# Patient Record
Sex: FEMALE | Race: WHITE | Employment: OTHER | URBAN - NONMETROPOLITAN AREA
[De-identification: names, ages, dates, MRNs, and addresses within clinical notes are randomized per-mention and may not be internally consistent; named-entity substitution may affect disease eponyms.]

---

## 2022-01-01 ENCOUNTER — APPOINTMENT (OUTPATIENT)
Dept: GENERAL RADIOLOGY | Age: 87
DRG: 871 | End: 2022-01-01
Attending: FAMILY MEDICINE
Payer: MEDICARE

## 2022-01-01 ENCOUNTER — HOSPITAL ENCOUNTER (INPATIENT)
Age: 87
LOS: 2 days | DRG: 871 | End: 2022-12-11
Attending: FAMILY MEDICINE | Admitting: INTERNAL MEDICINE
Payer: MEDICARE

## 2022-01-01 ENCOUNTER — APPOINTMENT (OUTPATIENT)
Dept: ULTRASOUND IMAGING | Age: 87
DRG: 871 | End: 2022-01-01
Attending: FAMILY MEDICINE
Payer: MEDICARE

## 2022-01-01 VITALS
OXYGEN SATURATION: 92 % | TEMPERATURE: 97.8 F | RESPIRATION RATE: 20 BRPM | SYSTOLIC BLOOD PRESSURE: 119 MMHG | WEIGHT: 99.65 LBS | DIASTOLIC BLOOD PRESSURE: 63 MMHG

## 2022-01-01 LAB
ABO: NORMAL
AEROBIC CULTURE: NORMAL
ALLEN TEST: POSITIVE
ANION GAP SERPL CALCULATED.3IONS-SCNC: 11 MEQ/L (ref 8–16)
ANION GAP SERPL CALCULATED.3IONS-SCNC: 13 MEQ/L (ref 8–16)
ANISOCYTOSIS: PRESENT
ANTIBODY SCREEN: NORMAL
BACTERIA: ABNORMAL
BASE EXCESS (CALCULATED): -2.7 MMOL/L (ref -2.5–2.5)
BASE EXCESS (CALCULATED): -2.8 MMOL/L (ref -2.5–2.5)
BASE EXCESS (CALCULATED): -3.3 MMOL/L (ref -2.5–2.5)
BASE EXCESS (CALCULATED): -3.8 MMOL/L (ref -2.5–2.5)
BASE EXCESS (CALCULATED): -3.8 MMOL/L (ref -2.5–2.5)
BASE EXCESS MIXED: -2.4 MMOL/L (ref -2–3)
BASOPHILS # BLD: 0.1 %
BASOPHILS ABSOLUTE: 0 THOU/MM3 (ref 0–0.1)
BILIRUBIN URINE: NEGATIVE
BLOOD, URINE: ABNORMAL
BUN BLDV-MCNC: 44 MG/DL (ref 7–22)
BUN BLDV-MCNC: 48 MG/DL (ref 7–22)
CALCIUM SERPL-MCNC: 7.6 MG/DL (ref 8.5–10.5)
CALCIUM SERPL-MCNC: 8 MG/DL (ref 8.5–10.5)
CASTS: ABNORMAL /LPF
CASTS: ABNORMAL /LPF
CHARACTER, URINE: ABNORMAL
CHLORIDE BLD-SCNC: 104 MEQ/L (ref 98–111)
CHLORIDE BLD-SCNC: 105 MEQ/L (ref 98–111)
CO2: 21 MEQ/L (ref 23–33)
CO2: 23 MEQ/L (ref 23–33)
COLLECTED BY:: ABNORMAL
COLOR: YELLOW
CREAT SERPL-MCNC: 2 MG/DL (ref 0.4–1.2)
CREAT SERPL-MCNC: 2.1 MG/DL (ref 0.4–1.2)
CREATININE URINE: 71.1 MG/DL
CRYSTALS: ABNORMAL
DEVICE: ABNORMAL
EOSINOPHIL # BLD: 0 %
EOSINOPHILS ABSOLUTE: 0 THOU/MM3 (ref 0–0.4)
EPITHELIAL CELLS, UA: ABNORMAL /HPF
ERYTHROCYTE [DISTWIDTH] IN BLOOD BY AUTOMATED COUNT: 16.6 % (ref 11.5–14.5)
ERYTHROCYTE [DISTWIDTH] IN BLOOD BY AUTOMATED COUNT: 72 FL (ref 35–45)
FIO2, MIXED VENOUS: 6
GFR SERPL CREATININE-BSD FRML MDRD: 22 ML/MIN/1.73M2
GFR SERPL CREATININE-BSD FRML MDRD: 23 ML/MIN/1.73M2
GLUCOSE BLD-MCNC: 130 MG/DL (ref 70–108)
GLUCOSE BLD-MCNC: 164 MG/DL (ref 70–108)
GLUCOSE, URINE: NEGATIVE MG/DL
HCO3, MIXED: 26 MMOL/L (ref 23–28)
HCO3: 25 MMOL/L (ref 23–28)
HCO3: 25 MMOL/L (ref 23–28)
HCO3: 26 MMOL/L (ref 23–28)
HCO3: 26 MMOL/L (ref 23–28)
HCO3: 27 MMOL/L (ref 23–28)
HCT VFR BLD CALC: 23.7 % (ref 37–47)
HCT VFR BLD CALC: 24.9 % (ref 37–47)
HCT VFR BLD CALC: 26.2 % (ref 37–47)
HCT VFR BLD CALC: 27 % (ref 37–47)
HEMOGLOBIN: 7 GM/DL (ref 12–16)
HEMOGLOBIN: 7.2 GM/DL (ref 12–16)
HEMOGLOBIN: 7.6 GM/DL (ref 12–16)
HEMOGLOBIN: 7.7 GM/DL (ref 12–16)
HYPOCHROMIA: PRESENT
IFIO2: 40
IFIO2: 6
IMMATURE GRANS (ABS): 0.13 THOU/MM3 (ref 0–0.07)
IMMATURE GRANULOCYTES: 0.8 %
KETONES, URINE: NEGATIVE
LEUKOCYTE EST, POC: ABNORMAL
LYMPHOCYTES # BLD: 0.8 %
LYMPHOCYTES ABSOLUTE: 0.1 THOU/MM3 (ref 1–4.8)
MACROCYTES: PRESENT
MCH RBC QN AUTO: 35.5 PG (ref 26–33)
MCHC RBC AUTO-ENTMCNC: 28.9 GM/DL (ref 32.2–35.5)
MCV RBC AUTO: 122.7 FL (ref 81–99)
MISCELLANEOUS LAB TEST RESULT: ABNORMAL
MODE: ABNORMAL
MONOCYTES # BLD: 1.3 %
MONOCYTES ABSOLUTE: 0.2 THOU/MM3 (ref 0.4–1.3)
MRSA SCREEN RT-PCR: NEGATIVE
NITRITE, URINE: NEGATIVE
NUCLEATED RED BLOOD CELLS: 0 /100 WBC
O2 SAT, MIXED: 36 %
O2 SATURATION: 98 %
O2 SATURATION: 99 %
PATHOLOGIST REVIEW: ABNORMAL
PCO2, MIXED VENOUS: 68 MMHG (ref 41–51)
PCO2: 62 MMHG (ref 35–45)
PCO2: 71 MMHG (ref 35–45)
PCO2: 72 MMHG (ref 35–45)
PCO2: 73 MMHG (ref 35–45)
PCO2: 74 MMHG (ref 35–45)
PH BLOOD GAS: 7.16 (ref 7.35–7.45)
PH BLOOD GAS: 7.16 (ref 7.35–7.45)
PH BLOOD GAS: 7.17 (ref 7.35–7.45)
PH BLOOD GAS: 7.17 (ref 7.35–7.45)
PH BLOOD GAS: 7.21 (ref 7.35–7.45)
PH UA: 5 (ref 5–9)
PH, MIXED: 7.2 (ref 7.31–7.41)
PIP: 16 CMH2O
PIP: 20 CMH2O
PIP: 22 CMH2O
PIP: 22 CMH2O
PLATELET # BLD: 184 THOU/MM3 (ref 130–400)
PLATELET ESTIMATE: ADEQUATE
PMV BLD AUTO: 10.1 FL (ref 9.4–12.4)
PO2 MIXED: 27 MMHG (ref 25–40)
PO2: 127 MMHG (ref 71–104)
PO2: 135 MMHG (ref 71–104)
PO2: 139 MMHG (ref 71–104)
PO2: 142 MMHG (ref 71–104)
PO2: 164 MMHG (ref 71–104)
POTASSIUM SERPL-SCNC: 4.3 MEQ/L (ref 3.5–5.2)
POTASSIUM SERPL-SCNC: 4.8 MEQ/L (ref 3.5–5.2)
PROCALCITONIN: 2.44 NG/ML (ref 0.01–0.09)
PROTEIN UA: 100 MG/DL
RBC # BLD: 2.03 MILL/MM3 (ref 4.2–5.4)
RBC URINE: ABNORMAL /HPF
RENAL EPITHELIAL, UA: ABNORMAL
RH FACTOR: NORMAL
SCAN OF BLOOD SMEAR: NORMAL
SEG NEUTROPHILS: 97 %
SEGMENTED NEUTROPHILS ABSOLUTE COUNT: 14.9 THOU/MM3 (ref 1.8–7.7)
SET PEEP: 6 MMHG
SET PRESS SUPP: 10 CMH2O
SET PRESS SUPP: 14 CMH2O
SET PRESS SUPP: 16 CMH2O
SET RESPIRATORY RATE: 22 BPM
SITE: ABNORMAL
SODIUM BLD-SCNC: 138 MEQ/L (ref 135–145)
SODIUM BLD-SCNC: 139 MEQ/L (ref 135–145)
SODIUM URINE: 22 MEQ/L
SOURCE, BLOOD GAS: ABNORMAL
SPECIFIC GRAVITY UA: 1.02 (ref 1–1.03)
TOTAL CK: 254 U/L (ref 30–135)
URIC ACID: 8.2 MG/DL (ref 2.4–5.7)
UROBILINOGEN, URINE: 0.2 EU/DL (ref 0–1)
VANCOMYCIN RESISTANT ENTEROCOCCUS: NEGATIVE
WBC # BLD: 15.4 THOU/MM3 (ref 4.8–10.8)
WBC UA: ABNORMAL /HPF
YEAST: ABNORMAL

## 2022-01-01 PROCEDURE — 99232 SBSQ HOSP IP/OBS MODERATE 35: CPT | Performed by: INTERNAL MEDICINE

## 2022-01-01 PROCEDURE — 36415 COLL VENOUS BLD VENIPUNCTURE: CPT

## 2022-01-01 PROCEDURE — 82550 ASSAY OF CK (CPK): CPT

## 2022-01-01 PROCEDURE — 36600 WITHDRAWAL OF ARTERIAL BLOOD: CPT

## 2022-01-01 PROCEDURE — 85014 HEMATOCRIT: CPT

## 2022-01-01 PROCEDURE — 94660 CPAP INITIATION&MGMT: CPT

## 2022-01-01 PROCEDURE — 2060000000 HC ICU INTERMEDIATE R&B

## 2022-01-01 PROCEDURE — 6370000000 HC RX 637 (ALT 250 FOR IP): Performed by: INTERNAL MEDICINE

## 2022-01-01 PROCEDURE — 99223 1ST HOSP IP/OBS HIGH 75: CPT | Performed by: INTERNAL MEDICINE

## 2022-01-01 PROCEDURE — 84145 PROCALCITONIN (PCT): CPT

## 2022-01-01 PROCEDURE — 85018 HEMOGLOBIN: CPT

## 2022-01-01 PROCEDURE — 2580000003 HC RX 258: Performed by: INTERNAL MEDICINE

## 2022-01-01 PROCEDURE — 99239 HOSP IP/OBS DSCHRG MGMT >30: CPT | Performed by: INTERNAL MEDICINE

## 2022-01-01 PROCEDURE — 2700000000 HC OXYGEN THERAPY PER DAY

## 2022-01-01 PROCEDURE — 86900 BLOOD TYPING SEROLOGIC ABO: CPT

## 2022-01-01 PROCEDURE — 93306 TTE W/DOPPLER COMPLETE: CPT

## 2022-01-01 PROCEDURE — 80048 BASIC METABOLIC PNL TOTAL CA: CPT

## 2022-01-01 PROCEDURE — 6360000002 HC RX W HCPCS: Performed by: INTERNAL MEDICINE

## 2022-01-01 PROCEDURE — 84550 ASSAY OF BLOOD/URIC ACID: CPT

## 2022-01-01 PROCEDURE — 85025 COMPLETE CBC W/AUTO DIFF WBC: CPT

## 2022-01-01 PROCEDURE — 87070 CULTURE OTHR SPECIMN AEROBIC: CPT

## 2022-01-01 PROCEDURE — 82803 BLOOD GASES ANY COMBINATION: CPT

## 2022-01-01 PROCEDURE — 87500 VANOMYCIN DNA AMP PROBE: CPT

## 2022-01-01 PROCEDURE — 81001 URINALYSIS AUTO W/SCOPE: CPT

## 2022-01-01 PROCEDURE — 71045 X-RAY EXAM CHEST 1 VIEW: CPT

## 2022-01-01 PROCEDURE — 6360000002 HC RX W HCPCS: Performed by: STUDENT IN AN ORGANIZED HEALTH CARE EDUCATION/TRAINING PROGRAM

## 2022-01-01 PROCEDURE — 86850 RBC ANTIBODY SCREEN: CPT

## 2022-01-01 PROCEDURE — 82570 ASSAY OF URINE CREATININE: CPT

## 2022-01-01 PROCEDURE — 99222 1ST HOSP IP/OBS MODERATE 55: CPT | Performed by: INTERNAL MEDICINE

## 2022-01-01 PROCEDURE — 87641 MR-STAPH DNA AMP PROBE: CPT

## 2022-01-01 PROCEDURE — 84300 ASSAY OF URINE SODIUM: CPT

## 2022-01-01 PROCEDURE — 76770 US EXAM ABDO BACK WALL COMP: CPT

## 2022-01-01 PROCEDURE — 99233 SBSQ HOSP IP/OBS HIGH 50: CPT | Performed by: INTERNAL MEDICINE

## 2022-01-01 PROCEDURE — 94761 N-INVAS EAR/PLS OXIMETRY MLT: CPT

## 2022-01-01 PROCEDURE — 86901 BLOOD TYPING SEROLOGIC RH(D): CPT

## 2022-01-01 PROCEDURE — 2580000003 HC RX 258: Performed by: STUDENT IN AN ORGANIZED HEALTH CARE EDUCATION/TRAINING PROGRAM

## 2022-01-01 RX ORDER — LORAZEPAM 2 MG/ML
0.5 INJECTION INTRAMUSCULAR EVERY 4 HOURS PRN
Status: DISCONTINUED | OUTPATIENT
Start: 2022-01-01 | End: 2022-01-01 | Stop reason: HOSPADM

## 2022-01-01 RX ORDER — GLYCOPYRROLATE 0.2 MG/ML
0.1 INJECTION INTRAMUSCULAR; INTRAVENOUS EVERY 4 HOURS PRN
Status: DISCONTINUED | OUTPATIENT
Start: 2022-01-01 | End: 2022-01-01 | Stop reason: HOSPADM

## 2022-01-01 RX ORDER — DORZOLAMIDE HCL 20 MG/ML
1 SOLUTION/ DROPS OPHTHALMIC 2 TIMES DAILY
Status: DISCONTINUED | OUTPATIENT
Start: 2022-01-01 | End: 2022-01-01

## 2022-01-01 RX ORDER — SODIUM CHLORIDE 9 MG/ML
INJECTION, SOLUTION INTRAVENOUS CONTINUOUS
Status: DISCONTINUED | OUTPATIENT
Start: 2022-01-01 | End: 2022-01-01

## 2022-01-01 RX ORDER — MORPHINE SULFATE 2 MG/ML
2 INJECTION, SOLUTION INTRAMUSCULAR; INTRAVENOUS
Status: DISCONTINUED | OUTPATIENT
Start: 2022-01-01 | End: 2022-01-01 | Stop reason: HOSPADM

## 2022-01-01 RX ORDER — ACETAMINOPHEN 500 MG
500 TABLET ORAL PRN
Status: DISCONTINUED | OUTPATIENT
Start: 2022-01-01 | End: 2022-01-01 | Stop reason: HOSPADM

## 2022-01-01 RX ORDER — MORPHINE SULFATE 4 MG/ML
4 INJECTION, SOLUTION INTRAMUSCULAR; INTRAVENOUS
Status: DISCONTINUED | OUTPATIENT
Start: 2022-01-01 | End: 2022-01-01 | Stop reason: HOSPADM

## 2022-01-01 RX ORDER — BEPOTASTINE BESILATE 15 MG/ML
1 SOLUTION/ DROPS OPHTHALMIC 2 TIMES DAILY
Status: DISCONTINUED | OUTPATIENT
Start: 2022-01-01 | End: 2022-01-01 | Stop reason: RX

## 2022-01-01 RX ADMIN — DORZOLAMIDE HYDROCHLORIDE 1 DROP: 20 SOLUTION/ DROPS OPHTHALMIC at 23:15

## 2022-01-01 RX ADMIN — LORAZEPAM 0.5 MG: 2 INJECTION INTRAMUSCULAR; INTRAVENOUS at 11:22

## 2022-01-01 RX ADMIN — SODIUM CHLORIDE: 9 INJECTION, SOLUTION INTRAVENOUS at 08:42

## 2022-01-01 RX ADMIN — DORZOLAMIDE HYDROCHLORIDE 1 DROP: 20 SOLUTION/ DROPS OPHTHALMIC at 08:41

## 2022-01-01 RX ADMIN — CEFTRIAXONE SODIUM 1000 MG: 1 INJECTION, POWDER, FOR SOLUTION INTRAMUSCULAR; INTRAVENOUS at 20:08

## 2022-01-01 RX ADMIN — MORPHINE SULFATE 4 MG: 4 INJECTION, SOLUTION INTRAMUSCULAR; INTRAVENOUS at 15:03

## 2022-01-01 RX ADMIN — SODIUM CHLORIDE: 9 INJECTION, SOLUTION INTRAVENOUS at 15:50

## 2022-01-01 ASSESSMENT — ENCOUNTER SYMPTOMS
BACK PAIN: 1
ALLERGIC/IMMUNOLOGIC NEGATIVE: 1
SHORTNESS OF BREATH: 1
EYES NEGATIVE: 1
GASTROINTESTINAL NEGATIVE: 1

## 2022-01-01 ASSESSMENT — PAIN SCALES - WONG BAKER
WONGBAKER_NUMERICALRESPONSE: 0
WONGBAKER_NUMERICALRESPONSE: 0
WONGBAKER_NUMERICALRESPONSE: 2
WONGBAKER_NUMERICALRESPONSE: 0
WONGBAKER_NUMERICALRESPONSE: 2
WONGBAKER_NUMERICALRESPONSE: 2

## 2022-01-01 ASSESSMENT — PAIN DESCRIPTION - ORIENTATION: ORIENTATION: RIGHT

## 2022-01-01 ASSESSMENT — PAIN - FUNCTIONAL ASSESSMENT: PAIN_FUNCTIONAL_ASSESSMENT: PREVENTS OR INTERFERES SOME ACTIVE ACTIVITIES AND ADLS

## 2022-01-01 ASSESSMENT — PAIN DESCRIPTION - LOCATION: LOCATION: HIP

## 2022-12-09 NOTE — PROGRESS NOTES
[facility] , Dr Trevon Lozada. 81 yo admitted to [facility] at [facility], post op R hip fx surgery yesterday, ABG in PACU with Ph 7.13 ,narcs reversed and placed on Bipap. ABG today Ph 7.283, PCO2 58, PO2 102, Bicarb 26.9. On bipap 30%. H&H today 7/22.8 (hgb was 10 preop), they have not transfused her. Urine output only 200 cc in 20 hrs despite fluid resus. given, fluids stopped last night as they were thinking she was overloaded. CXR today shows LLL atelectasis vs pneumonia, small pleural effusion, no sign of fluid overload. BUN/creat 41/2.0, yesterday was 31/1.5. IV fluids being restarted. Current vitals 97% on 30% bipap, 130/76, 81, 18, afebrile. Pt is full code. Right now just getting some fluids, they are avoiding narcs, they may transfuse.

## 2022-12-09 NOTE — H&P
History & Physical       Patient: Giovana Dutta  YOB: 1930    MRN: [id number]     Acct: [de-identified]    PCP: LINDSEY Gama CNP    Date of Admission: 12/9/2022    Date of Service: Patient seen / examined on 12/09/22 and admitted to Inpatient with expected LOS greater than two midnights due to medical therapy. ASSESSMENT / PLAN:    Acute hypoxic/hypercapnic respiratory failure -now requiring high flow nasal cannula, no supplemental oxygen at baseline. Chest x-ray today revealed left lower lobe atelectasis versus pneumonia with small pleural effusion, no signs of fluid overload. Patient also underwent recent surgery, found to have PCO2 58 postsurgery. Plan -continue to wean off supplemental oxygen. Continue BiPAP. Check ABG now. treat as below    Left lower lobe atelectasis versus pneumonia -seen on chest x-ray. Patient was treated with Levaquin at outlying facility. WBC elevated at 15.4  Plan-we will obtain procalcitonin, sputum culture, pneumonia panel. Initiate therapy with azithromycin and Rocephin. Acute kidney injury -patient is now oliguric. BUN to creatinine > 20. Plan -consult nephrology. Avoid nephrotoxic agents. Renally dose medications. Acute blood loss anemia -hemoglobin 7.2. No previous hemoglobin in the chart. This is secondary to recent surgery. No signs of bleeding on physical exam.   Plan -transfuse if hemoglobin less than 7. Closely monitor H&H every 6 hours. For DVT PE prophylaxis, will place on SCDs, avoid Lovenox/heparin. CODE STATUS -patient is now limited x4. We will consult palliative care for possible discussion for goals of care as cousin and caregiver are leaning towards comfort care. History of Present Illness:  80 y.o. female who presented to [address] [place] [address] as a transfer from [facility]. Patient has multiple comorbid conditions. Patient underwent right hip surgery for fracture.   After the surgery patient remained obtunded and narcotics were reversed. She was then moved to the ICU. She was placed on BiPAP, she was not intubated. She was initially treated with IV fluids but they were stopped yesterday due to concern for possible overload. Chest x-ray today revealed left lower lobe atelectasis versus pneumonia with small pleural effusion, no signs of fluid overload. BUN to creatinine ratio noted to be 41/2, yesterday was 31/1.5. IV fluids were restarted again. Patient was transferred to [facility] for further evaluation. History provided by caregiver and cousin at bedside. Caregiver states that prior to the surgery patient was using a cane to move around. They do request for palliative care to see the patient to discuss further goals of care.        Past Medical History:    Past Medical History:   Diagnosis Date    Abnormality, red blood cells     Alopecia     Asthma, extrinsic     Backache     Bacterial pneumonia, unspecified     Benign paroxysmal positional vertigo     Bone or cartilage disorder, other     Compression fracture     Diverticulosis of colon (without mention of hemorrhage)     Essential hypertension, benign     Follicular cyst of ovary     Generalized osteoarthrosis, involving multiple sites     Hypopotassemia     Hypopotassemia     Need for prophylactic vaccination and inoculation against influenza     Neoplasm of uncertain behavior of skin     Personal history of malignant neoplasm of breast     Personal history of malignant neoplasm of unspecified female genital organ     Pneumonia, organism unspecified     Precipitous drop in hematocrit     Renal failure, unspecified     Screening for lipoid disorders     Screening for malignant neoplasm of the rectum     Senile osteoporosis     Special screening for malignant neoplasms of other sites     Unspecified disorder of skin and subcutaneous tissue      Past Surgical History:    Past Surgical History:   Procedure Laterality Date    HERNIA REPAIR  1966    HYSTERECTOMY, TOTAL ABDOMINAL  07-    with [facility], endometrial carcinoma      Medications Prior to Admission:   No current facility-administered medications on file prior to encounter. Current Outpatient Medications on File Prior to Encounter   Medication Sig Dispense Refill    doxycycline (VIBRAMYCIN) 100 MG capsule Take 100 mg by mouth daily. Bepotastine Besilate 1.5 % SOLN Apply  to eye 2 times daily. levofloxacin in D5W (LEVAQUIN) 500 MG/100ML SOLN Infuse 500 mg intravenously every 24 hours. levofloxacin (LEVAQUIN) 500 MG tablet Take 500 mg by mouth daily. Tiotropium Bromide Monohydrate (SPIRIVA HANDIHALER IN) Inhale 18 mcg into the lungs daily. predniSONE (DELTASONE) 10 MG tablet Take 10 mg by mouth daily. ipratropium-albuterol (DUONEB) 0.5-2.5 (3) MG/3ML SOLN nebulizer solution Inhale 1 vial into the lungs 4 times daily as needed. POTASSIUM CHLORIDE Take 10 mEq by mouth daily. clotrimazole-betamethasone (LOTRISONE) cream Apply  topically nightly as needed. Apply topically 2 times daily. hydrochlorothiazide (HYDRODIURIL) 25 MG tablet Take 25 mg by mouth daily. 1/2 Tablet daily. aspirin 81 MG tablet Take 81 mg by mouth daily. acetaminophen (TYLENOL) 500 MG tablet Take 500 mg by mouth as needed. 1 Tab every morning      Garlic Oil 6990 MG CAPS Take 1,000 mg by mouth. Omega 3 1000 MG CAPS Take 1,000 mg by mouth daily. calcium-vitamin D (OSCAL) 250-125 MG-UNIT per tablet Take 1 tablet by mouth daily. Does not take every day      Multiple Vitamins-Minerals (ICAPS) CAPS Take  by mouth daily. naproxen sodium (ALEVE) 220 MG tablet Take 220 mg by mouth daily. dorzolamide (TRUSOPT) 2 % ophthalmic solution Place 1 drop into both eyes 2 times daily. aspirin 325 MG tablet Take 325 mg by mouth daily. Allergies:   Patient has no known allergies.     Social History:   Social History Socioeconomic History    Marital status:      Spouse name: Not on file    Number of children: Not on file    Years of education: Not on file    Highest education level: Not on file   Occupational History    Not on file   Tobacco Use    Smoking status: Never    Smokeless tobacco: Never   Substance and Sexual Activity    Alcohol use: Not on file    Drug use: Not on file    Sexual activity: Not on file   Other Topics Concern    Not on file   Social History Narrative    Not on file     Social Determinants of Health     Financial Resource Strain: Not on file   Food Insecurity: Not on file   Transportation Needs: Not on file   Physical Activity: Not on file   Stress: Not on file   Social Connections: Not on file   Intimate Partner Violence: Not on file   Housing Stability: Not on file     Family History:    No family history on file. REVIEW OF SYSTEMS:  A 14-point ROS was obtained and negative, with the exception of pertinent positives as listed below:    Review of Systems   Constitutional:  Positive for fatigue. HENT: Negative. Eyes: Negative. Respiratory:  Positive for shortness of breath. Cardiovascular: Negative. Gastrointestinal: Negative. Endocrine: Negative. Genitourinary: Negative. Musculoskeletal:  Positive for back pain. Allergic/Immunologic: Negative. Neurological: Negative. Hematological: Negative. Psychiatric/Behavioral:  Positive for confusion. PHYSICAL EXAM:  Vitals:    12/09/22 1404 12/09/22 1500   BP: (!) 149/80 133/85   Pulse: (!) 107 91   Resp: 24 20   Temp: 97.5 °F (36.4 °C)    TempSrc: Oral    SpO2: 100%    Weight: 98 lb 1.7 oz (44.5 kg)      General appearance: Acute on chronically ill elderly female. Nasal canula. Lethargic   HEENT:  Normocephalic / atraumatic. PERRL. EOM intact. Conjunctivae appear normal.  Neck: Supple. No JVD. Respiratory: Normal respiratory effort on RA. Cardiovascular: Tachycardia.  .  Abdomen: Soft / non-tender / non-distended. BS present. Musculoskeletal: No cyanosis or edema. Skin: Warm / dry. Normal turgor. Neurologic: Unable to assess    Labs:   Results for orders placed or performed during the hospital encounter of 90/74/71   Basic Metabolic Panel   Result Value Ref Range    Sodium 139 135 - 145 meq/L    Potassium 4.3 3.5 - 5.2 meq/L    Chloride 105 98 - 111 meq/L    CO2 21 (L) 23 - 33 meq/L    Glucose 164 (H) 70 - 108 mg/dL    BUN 44 (H) 7 - 22 mg/dL    Creatinine 2.0 (H) 0.4 - 1.2 mg/dL    Calcium 7.6 (L) 8.5 - 10.5 mg/dL   CBC with Auto Differential   Result Value Ref Range    WBC 15.4 (H) 4.8 - 10.8 thou/mm3    RBC 2.03 (L) 4.20 - 5.40 mill/mm3    Hemoglobin 7.2 (L) 12.0 - 16.0 gm/dl    Hematocrit 24.9 (L) 37.0 - 47.0 %    .7 (H) 81.0 - 99.0 fL    MCH 35.5 (H) 26.0 - 33.0 pg    MCHC 28.9 (L) 32.2 - 35.5 gm/dl    RDW-CV 16.6 (H) 11.5 - 14.5 %    RDW-SD 72.0 (H) 35.0 - 45.0 fL    Platelets 574 906 - 241 thou/mm3    MPV 10.1 9.4 - 12.4 fL   CK   Result Value Ref Range    Total  (H) 30 - 135 U/L   Uric Acid   Result Value Ref Range    Uric Acid 8.2 (H) 2.4 - 5.7 mg/dL   Anion Gap   Result Value Ref Range    Anion Gap 13.0 8.0 - 16.0 meq/L   Glomerular Filtration Rate, Estimated   Result Value Ref Range    Est, Glom Filt Rate 23 (A) >60 ml/min/1.73m2   TYPE AND SCREEN   Result Value Ref Range    ABO O     Rh Factor NEG     Antibody Screen NEG        EKG / Radiology:     EKG:  Reviewed by me --    CXR:   Reviewed by me --    XR CHEST PORTABLE    Result Date: 12/9/2022  PROCEDURE: XR CHEST PORTABLE CLINICAL INFORMATION: dyspnea . TECHNIQUE: Portable semiupright COMPARISON: No prior study. FINDINGS: The heart is enlarged in size. Mediastinum is not widened. Retrocardiac opacity with air bronchogram. Small left effusion. Focal opacity right upper lobe has a nodular-like appearance. Vessels are not congested. Cardiomegaly. Possible retrocardiac consolidation. Left pleural effusion.  Atelectasis versus infiltrate versus nodule right upper lobe. **This report has been created using voice recognition software. It may contain minor errors which are inherent in voice recognition technology. ** Final report electronically signed by Dr. Karlie Mathews on 12/9/2022 3:09 PM    FEN/GI/DVT:  IVF: NS @ 65 cc/hr  Electrolytes: Monitor and replace per protocols  Diet: Clear Liquid  GI PPX: No  DVT Prophylaxis: SCDs    CODE STATUS:  Limited - 4    Thank you Lora Valenzuela, APRN - CNP for the opportunity to be involved in this patient's care.     Electronically signed by Alethea Dong MD PGY-3 IM on 12/9/2022 at 4:59 PM

## 2022-12-09 NOTE — PALLIATIVE CARE
Initial Evaluation          Patient: General Rooney  YOB: 1930  Age:  80 y.o. Room:  [facility]  MRN:  [id number]   Acct: [de-identified]    Date of Admission:  12/9/2022  2:04 PM  Date of Service:  12/9/2022  Completed By:  Fransico Brizuela RN                 Reason for Palliative Care Evaluation:-             [] Code Status Discussion              [x] Goals of Care              [] Pain/Symptom Management               [] Emotional Support              [] Other:                   Current Issues:-  [x]  Pain  []  Fatigue  []  Nausea  []  Anxiety  []  Depression  [x]  Shortness of Breath  []  Constipation  []  Appetite  []  Other:             Advance Directives:-  [] [place] DNR Form  [] Living Will  [] Medical POA             Current Code Status:-  [] Full Resuscitation  [] DNR-Comfort Care-Arrest  [] DNR-Comfort Care       [x] Limited Resuscitation             [x] No CPR            [x] No shock            [x] No ET intubation/reintubation            [x] No resuscitative medications            [] Other limitation:              Palliative Performance Status:          [] 60%  Ambulation reduced; Significant disease;Can't do hobbies/housework; intake normal or reduced; occasional assist; LOC full/confusion        [] 50%  Mainly sit/lie; Extensive disease; Can't do any work; Considerable assist; intake normal or reduced; LOC full/confusion        [x] 40%  Mainly in bed; Extensive disease; Mainly assist; intake normal or reduced; LOC full/confusion         [] 30%  Bed Bound; Extensive disease; Total care; intake reduced; LOC full/confusion        [] 20%  Bed Bound; Extensive disease; Total care; intake minimal; Drowsy/coma        [] 10%  Bed Bound; Extensive disease;  Total care; Mouth care only; Drowsy/coma        [] 0  Death        Goals of care evaluation:-        The patient goals of care are to provide comfort care/supportive services/palliation & relieve suffering:  Goals of care discussed with:  [] Patient independently  [] Patient and Family  [x] Family or Healthcare DPOA independently  [] Unable to discuss with patient, family/DPOA not present         Family/Patient Discussion:  [id number]: Spoke with Carlyn Loman caregiver Chetan Barnhart and cousin Farooq Green. Carmel Crockett is in bed, pleasantly confused and being attended to by lab personnel and primary RN. Chetan Barnhart stated he is her POA and has been Yamile Pink's caregiver for over 22 years. He stated Katia Humphrey has good days and bad days\", she uses a cane and has trouble walking through the house at times. She suffered a hip fracture that was repaired Wednesday at [facility], then transferred to [facility]. Meaghan's d/t respiratory distress. Discussed code status. Chetan Barnhart confirms code status as limited x4, no CPR, no shock, no resuscitative meds, and no intubation. Explained comfort care. At this point, Chetan Barnhart stated he would like to speak to the doctor for some guidance as he doesn't know what to do.     1623: Perfect serve message sent to Dr. Kiana Schmitt as requested from Chetan Barnhart to discuss plan of care. 1630: Dr. Kiana Schmitt on floor speaking with Chetan Barnhart. At this time, we will continue with current plan of care, limited x4 code status and see how Carmel Crockett does. We will keep Naomiyosvany rBavosimeon updated on her progress and adjust the care accordingly. Chetan Barnhart stated he does not want to put Carmel Crockett through anything that is not going to help her and he doesn't want he to \"suffer\". Plan/Follow-Up:  Primary nurse updated. Will continue to follow .         Electronically signed by Carissa Perera RN on 12/9/2022 at 4:24 PM           Palliative Care Office: [phone]

## 2022-12-09 NOTE — CONSENT
Informed Consent for Blood Component Transfusion Note    I have discussed with the patient's adult caretaker the rationale for blood component transfusion; its benefits in treating or preventing fatigue, organ damage, or death; and its risk which includes mild transfusion reactions, rare risk of blood borne infection, or more serious but rare reactions. I have discussed the alternatives to transfusion, including the risk and consequences of not receiving transfusion. The patient's adult caretaker had an opportunity to ask questions and had agreed to proceed with transfusion of blood components.     Electronically signed by Hernandez Stewart MD IM PGY-3 on 12/9/22 at 6:24 PM EST

## 2022-12-10 PROBLEM — J96.01 ACUTE RESPIRATORY FAILURE WITH HYPOXIA AND HYPERCAPNIA (HCC): Status: ACTIVE | Noted: 2022-01-01

## 2022-12-10 PROBLEM — N17.9 ACUTE KIDNEY INJURY (HCC): Status: ACTIVE | Noted: 2022-01-01

## 2022-12-10 PROBLEM — R34 OLIGURIA: Status: ACTIVE | Noted: 2022-01-01

## 2022-12-10 PROBLEM — J96.02 ACUTE RESPIRATORY FAILURE WITH HYPOXIA AND HYPERCAPNIA (HCC): Status: ACTIVE | Noted: 2022-01-01

## 2022-12-10 NOTE — PROGRESS NOTES
[facility]   Nephrology progress note  12/10/2022, 10:57 AM      Pt Name:    Haydee Luu  MRN:     [id number]     YOB: 1930  Admit Date:    12/9/2022  2:04 PM    Chief Complaint: Nephrology following for KYLAH    Subjective:  Patient was seen and examined this morning  On BiPAP  Frail  Elderly  Ill-appearing    Objective:  24HR INTAKE/OUTPUT:    Intake/Output Summary (Last 24 hours) at 12/10/2022 1057  Last data filed at 12/10/2022 0600  Gross per 24 hour   Intake 910.48 ml   Output --   Net 910.48 ml         I/O last 3 completed shifts: In: 910.5 [I.V.:861.3; IV Piggyback:49.2]  Out: -   No intake/output data recorded.    Admission weight: 98 lb 1.7 oz (44.5 kg)  Wt Readings from Last 3 Encounters:   12/10/22 99 lb 10.4 oz (45.2 kg)   04/09/13 140 lb (63.5 kg)   03/26/13 140 lb (63.5 kg)        Vitals :   Vitals:    12/10/22 0700 12/10/22 0808 12/10/22 0830 12/10/22 0955   BP: 121/68  122/63    Pulse: 82  81    Resp: 21 22 19 24   Temp:   97.7 °F (36.5 °C)    TempSrc:   Axillary    SpO2: 100%  100%    Weight:           Physical examination  General Appearance: alert and cooperative with exam, appears comfortable, no distress  Mouth/Throat: on BIPAP  Neck: No JVD  Lungs:  no use of accessory muscles  Heart:  S1, S2 heard  GI: soft, non-tender, no guarding  Extremities: no sig LE edema    Medications:  Infusion:    sodium chloride 65 mL/hr at 12/10/22 0842     Meds:    dorzolamide  1 drop Both Eyes BID    cefTRIAXone (ROCEPHIN) IV  1,000 mg IntraVENous Q24H     Meds prn: acetaminophen     Lab Data :  CBC:   Recent Labs     12/09/22  1600 12/09/22  1901 12/10/22  0033 12/10/22  0652   WBC 15.4*  --   --   --    HGB 7.2* 7.0* 7.7* 7.6*   HCT 24.9* 23.7* 27.0* 26.2*     --   --   --      CMP:  Recent Labs     12/09/22  1606 12/10/22  0652    138   K 4.3 4.8    104   CO2 21* 23   BUN 44* 48*   CREATININE 2.0* 2.1*   GLUCOSE 164* 130*   CALCIUM 7.6* 8.0*     Hepatic: No results for input(s): LABALBU, AST, ALT, ALB, BILITOT, ALKPHOS in the last 72 hours. Assessment and Plan:    KYLAH appears to be mostly prerenal vs evolving ATN. Urine sodium was 22. Patient with poor oral intake. Clinically appearing intravascularly dry. Currently on IV fluids and tolerating well. No evidence of volume overload by peripheral examination. Continue with IV fluids. Serum creatinine 2.1. Appears to have plateaued. Hopefully will improve. Will monitor. Baseline serum creatinine not available. No emergent need for renal replacement therapy at this time. Ultrasound negative for hydronephrosis  Acute hypoxic respiratory failure. Slowly improving. On BiPAP  Leukocytosis  Status post hip replacement  History of NSAID use per medication list  Medications reviewed      Brenna Quiñonez MD  [facility]    This report has been created using voice recognition software.  It may contain minor errors which are inherent in voice recognition technology

## 2022-12-10 NOTE — PROGRESS NOTES
Advance Care Planning     Advance Care Planning Inpatient Note  Spiritual Care Department    Today's Date: 12/10/2022  Unit: [facility]    Received request from Other: [address] . Upon review of chart and communication with care team, Spiritual Care will defer advance care planning with patient at this time. . Patient and Friends was/were present in the room during visit. Goals of ACP Conversation:  Discuss advance care planning documents  Facilitate a discussion related to patient's goals of care as they align with the patient's values and beliefs. Health Care Decision Makers:       Primary Decision Maker: [facility] - [phone]    Secondary Decision Maker: Evangelina Vishal  [phone]  Summary:  Verified Documents  Completed [address]    Advance Care Planning Documents (Patient Wishes):  Healthcare Power of /Advance Directive Appointment of Health Care Agent  Living Will/Advance Directive     Assessment:  While pt is in bed on 4B and is not awake at this time, her POA for health care requested that a  be called to do last rights and then he wanted to talk with her doctor to get Hospice on board. Patient's AD was updated, placed in medical records and a copy place in her chart. Ryan Patel was called to come and do last right. Father Vince Norman offered to come in. Interventions:  Copied papers needed after updates, and sent to medical records. Care Preferences Communicated:   Ventilation:   If the patient, in their present state of health, suddenly became very ill and unable to breathe on their own,     the patient would NOT desire the use of a ventilator (breathing machine). If their health worsens and it becomes clear that the change of recovery is unlikely,     the patient would NOT desire the use of a ventilator (breathing machine).     Resuscitation:  In the event the patient's heart stopped as a result of an underlying serious health condition, the patient communicates a preference for      a natural death (no CPR). Outcomes/Plan:  Returned original document(s) to patient, as well as copies for distribution to appointed agents  Copy of advance directive given to staff to scan into medical record. Routed ACP note to attending provider or other IDT member.     Electronically signed by Sher Watson, [address] [facility] on 12/10/2022 at 12:28 PM

## 2022-12-10 NOTE — PLAN OF CARE
Problem: Respiratory - Adult  Goal: Achieves optimal ventilation and oxygenation  Outcome: Progressing   Pt responded positively to the Bipap settings and the rate was increased to continue improvement.

## 2022-12-10 NOTE — CONSULTS
[facility]    [address], [address]  [facility]  12/9/2022 7:36 PM    Pt Name:    Annelise Rizo  MRN:     [id number]   [id number]  YOB: 1930  Admit Date:    12/9/2022  2:04 PM  Primary Care Physician:  LINDSEY Holloway CNP    [facility] Number:   [id number]    Reason for Consult:  Acute kidney injury  Requesting provider:  Hospitalist    History:   The patient is a 80 y.o. elderly female who recently underwent right hip surgery at [facility].  After surgery patient apparently remained obtunded and minimally responsive. Her narcotics were reversed. She was initially placed on BiPAP. Patient was subsequently noted to have acute kidney injury. She was initially treated with IV fluids but they were stopped due to concerns for possible overload. She was then transferred to [facility] for further evaluation. Patient seen and examined. She is not verbalizing at this time. No family members at bedside. Discussed with ICU RN. Currently on normal saline at 60 mils per hour. Has a Bowers catheter with scant urine output. Minimal information available at this time.     Past Medical History:  Past Medical History:   Diagnosis Date    Abnormality, red blood cells     Alopecia     Asthma, extrinsic     Backache     Bacterial pneumonia, unspecified     Benign paroxysmal positional vertigo     Bone or cartilage disorder, other     Compression fracture     Diverticulosis of colon (without mention of hemorrhage)     Essential hypertension, benign     Follicular cyst of ovary     Generalized osteoarthrosis, involving multiple sites     Hypopotassemia     Hypopotassemia     Need for prophylactic vaccination and inoculation against influenza     Neoplasm of uncertain behavior of skin     Personal history of malignant neoplasm of breast     Personal history of malignant neoplasm of unspecified female genital organ Pneumonia, organism unspecified     Precipitous drop in hematocrit     Renal failure, unspecified     Screening for lipoid disorders     Screening for malignant neoplasm of the rectum     Senile osteoporosis     Special screening for malignant neoplasms of other sites     Unspecified disorder of skin and subcutaneous tissue        Past Surgical History:  Past Surgical History:   Procedure Laterality Date    HERNIA REPAIR  1966    HYSTERECTOMY, TOTAL ABDOMINAL  07-    with [facility], endometrial carcinoma       Family History:  No family history on file. Social History:  Social History     Socioeconomic History    Marital status:      Spouse name: Not on file    Number of children: Not on file    Years of education: Not on file    Highest education level: Not on file   Occupational History    Not on file   Tobacco Use    Smoking status: Never    Smokeless tobacco: Never   Substance and Sexual Activity    Alcohol use: Not on file    Drug use: Not on file    Sexual activity: Not on file   Other Topics Concern    Not on file   Social History Narrative    Not on file     Social Determinants of Health     Financial Resource Strain: Not on file   Food Insecurity: Not on file   Transportation Needs: Not on file   Physical Activity: Not on file   Stress: Not on file   Social Connections: Not on file   Intimate Partner Violence: Not on file   Housing Stability: Not on file       Home Meds:  Prior to Admission medications    Medication Sig Start Date End Date Taking? Authorizing Provider   doxycycline (VIBRAMYCIN) 100 MG capsule Take 100 mg by mouth daily. Historical Provider, MD   Bepotastine Besilate 1.5 % SOLN Apply  to eye 2 times daily. Historical Provider, MD   levofloxacin in D5W (LEVAQUIN) 500 MG/100ML SOLN Infuse 500 mg intravenously every 24 hours. Historical Provider, MD   levofloxacin (LEVAQUIN) 500 MG tablet Take 500 mg by mouth daily.     Historical Provider, MD   Tiotropium Bromide Monohydrate (SPIRIVA HANDIHALER IN) Inhale 18 mcg into the lungs daily. Historical Provider, MD   predniSONE (DELTASONE) 10 MG tablet Take 10 mg by mouth daily. Historical Provider, MD   ipratropium-albuterol (DUONEB) 0.5-2.5 (3) MG/3ML SOLN nebulizer solution Inhale 1 vial into the lungs 4 times daily as needed. Historical Provider, MD   POTASSIUM CHLORIDE Take 10 mEq by mouth daily. Historical Provider, MD   clotrimazole-betamethasone (LOTRISONE) cream Apply  topically nightly as needed. Apply topically 2 times daily. Historical Provider, MD   hydrochlorothiazide (HYDRODIURIL) 25 MG tablet Take 25 mg by mouth daily. 1/2 Tablet daily. Historical Provider, MD   aspirin 81 MG tablet Take 81 mg by mouth daily. Historical Provider, MD   acetaminophen (TYLENOL) 500 MG tablet Take 500 mg by mouth as needed. 1 Tab every morning    Historical Provider, MD   Garlic Oil 7809 MG CAPS Take 1,000 mg by mouth. Historical Provider, MD   Omega 3 1000 MG CAPS Take 1,000 mg by mouth daily. Historical Provider, MD   calcium-vitamin D (OSCAL) 250-125 MG-UNIT per tablet Take 1 tablet by mouth daily. Does not take every day    Historical Provider, MD   Multiple Vitamins-Minerals (ICAPS) CAPS Take  by mouth daily. Historical Provider, MD   naproxen sodium (ALEVE) 220 MG tablet Take 220 mg by mouth daily. Historical Provider, MD   dorzolamide (TRUSOPT) 2 % ophthalmic solution Place 1 drop into both eyes 2 times daily. Historical Provider, MD   aspirin 325 MG tablet Take 325 mg by mouth daily. Historical Provider, MD       Review of Systems:    Review of system cannot be obtained at this time. Current Meds:  Infusion:    sodium chloride 65 mL/hr at 12/09/22 1724     Meds:    dorzolamide  1 drop Both Eyes BID    cefTRIAXone (ROCEPHIN) IV  1,000 mg IntraVENous Q24H     Meds prn: acetaminophen     Allergies/Intolerances: ALLERGIES: Patient has no known allergies.     24HR INTAKE/OUTPUT:  No intake or output data in the 24 hours ending 12/09/22 1936  No intake/output data recorded. No intake/output data recorded. Admission weight: 98 lb 1.7 oz (44.5 kg)  Wt Readings from Last 3 Encounters:   12/09/22 98 lb 1.7 oz (44.5 kg)   04/09/13 140 lb (63.5 kg)   03/26/13 140 lb (63.5 kg)     There is no height or weight on file to calculate BMI. Physical Examination:  VITALS:   Vitals:    12/09/22 1600 12/09/22 1700 12/09/22 1800 12/09/22 1900   BP:  (!) 152/123 111/62 (!) 105/59   Pulse: 80 81 (!) 102 85   Resp: 13 19 16 18   Temp: 97.7 °F (36.5 °C)      TempSrc: Oral      SpO2: 92% 94% 100% 99%   Weight:         Weight:   Wt Readings from Last 3 Encounters:   12/09/22 98 lb 1.7 oz (44.5 kg)   04/09/13 140 lb (63.5 kg)   03/26/13 140 lb (63.5 kg)     Constitutional and General Appearance: Ill-appearing, frail  Eyes: no icteric sclera in left eye or right eye, pallor conjunctiva both eyes  Ears and Nose: normal external appearance of left and right ear. Oral: Dry appearing oral mucus membranes  Neck: No jugular venous distention  Lungs: Air entry diminished, poor effort, no use of accessory muscles or labored breathing  Heart: S1, S2  Extremities: No pitting LE edema, no tenderness  GI: soft, non-tender, no guarding, no distention  Skin: Dry appearing, poor turgor  Neuro: Cannot be assessed at this time    Lab Data  CBC:   Recent Labs     12/09/22  1600   WBC 15.4*   HGB 7.2*   HCT 24.9*        BMP:  Recent Labs     12/09/22  1606      K 4.3      CO2 21*   BUN 44*   CREATININE 2.0*   GLUCOSE 164*   CALCIUM 7.6*     Hepatic: No results for input(s): LABALBU, AST, ALT, ALB, BILITOT, ALKPHOS in the last 72 hours. Additional Labs: Urine sodium 22  Diagnostics: Chest x-ray shows cardiomegaly, possible consolidation, left pleural effusion      Impression and Plan:  KYLAH appears to be mostly prerenal vs evolving ATN. Urine sodium was 22. Patient with poor oral intake.   Clinically appearing intravascularly dry.  Currently on IV fluids and tolerating well. No evidence of volume overload by peripheral examination. Will obtain full work-up of KYLAH including kidney ultrasound to evaluate echogenicity and kidney size and to ensure no underlying obstructive pathology. Agree with IV fluid hydration for now. Baseline serum creatinine not available. Patient is oliguric. No emergent need for renal replacement therapy at this time. Acute hypoxic respiratory failure. Follow ABG. Leukocytosis  Status post hip replacement  History of NSAID use per medication list  Medications reviewed  Discussed with ICU RN    Thank you for the consult. Please feel free to call me if you have any questions. Cheryle Rowell MD  [facility]    This report has been created using voice recognition software. It may contain minor errors which are inherent in voice recognition technology.

## 2022-12-10 NOTE — PROGRESS NOTES
Physician Progress Note      PATIENT:               Kyle Francisco  CSN #:                  [id number]  :                       4/10/1930  ADMIT DATE:       2022 2:04 PM  [address] [place] DATE:  RESPONDING  PROVIDER #:        Yara Vazquez MD          QUERY TEXT:    Pt admitted with acute hypoxic/hypercapnic respiratory failure. Noted   documentation of surgery and obtunded, requiring bipap post-operatively. ? Please document in progress notes and discharge summary if you are   evaluating/treating any of the following: The medical record reflects the following:  Risk Factors: acute respiratory failure  Clinical Indicators: Post-operatively at outside facility CO2 58, was given   narcan and placed on bipap and sent to their ICU prior to transfer  Treatment: Bipap/supplemental oxygen, Narcan, monitoring in ICU  Options provided:  -- Respiratory failure?is not due to the procedure but was due to, Please   specify. -- Acute? Postoperative Pulmonary Insufficiency, Postoperative Respiratory   failure is ruled?out  -- Postoperative Respiratory failure is due to the procedure  -- Acute respiratory failure due to pain medication post-operatively  -- Other - I will add my own diagnosis  -- Disagree - Not applicable / Not valid  -- Disagree - Clinically unable to determine / Unknown  -- Refer to Clinical Documentation Reviewer    PROVIDER RESPONSE TEXT:    Postoperative respiratory failure is due to the procedure.     Query created by: Bhavya Maldonado on 12/10/2022 8:03 AM      Electronically signed by:  Yara Vazquez MD 12/10/2022 4:03 PM

## 2022-12-10 NOTE — PROGRESS NOTES
Pt was unresponsive and will be transitioning to hospice care. The  on duty called me in for last rites. A care giver was with her in the room. Pt was anointed.     12/10/22 1252   Encounter Summary   Service Provided For: Patient;Friend   Referral/Consult From: Other    Support System Hospice   Last Encounter  12/10/22  (Anointed (NR))   Complexity of Encounter Low   Begin Time 1220   End Time  1230   Total Time Calculated 10 min   Crisis   Type Family Care   Spiritual/Emotional needs   Type Spiritual Support   Rituals, Rites and Sacraments   Type Anointing

## 2022-12-10 NOTE — PROGRESS NOTES
Assessment and Plan:        Acute respiratory faiure; not doing well; pC02 remains elevated despite several bipap adjustments. No problem oxygenating.  : will boost resp rate  KYLAH- labs pending- 350 ml output overnight. Nephrology following. CC:  hypercarbia  HPI:  elderly pt with recent fx hip, developed CO2 retention and oliguria. Transferred here. Hx dementia. ROS (12 point review of systems completed. Pertinent positives noted. Otherwise ROS is negative) : not able to obtain due to condition  PMH:  Per HPI  SHX:  lives with caretaker  [id number]: Noncontributory    Allergies: See above    Medications:     sodium chloride 65 mL/hr at 12/09/22 1724      dorzolamide  1 drop Both Eyes BID    cefTRIAXone (ROCEPHIN) IV  1,000 mg IntraVENous Q24H       Vital Signs:   /69   Pulse 80   Temp 97.2 °F (36.2 °C) (Oral)   Resp 15   Wt 98 lb 1.7 oz (44.5 kg)   SpO2 100%    No intake or output data in the 24 hours ending 12/10/22 0621     Physical Examination: General appearance - chronically ill appearing and drowsy  Mental status - drowsy  Neck - supple, no significant adenopathy, no JVD, or carotid bruits  Chest - clear to auscultation, no wheezes, rales or rhonchi, symmetric air entry  Heart - normal rate, regular rhythm, normal S1, S2, no murmurs, rubs, clicks or gallops, marked JVD  Abdomen - soft, nontender, nondistended, no masses or organomegaly  Neurological - . See above    Musculoskeletal - no muscular tenderness noted  Extremities - no pedal edema noted  Skin - normal coloration and turgor, no rashes, no suspicious skin lesions noted    Data: (All radiographs, tracings, PFTs, and imaging are personally viewed and interpreted unless otherwise noted).          Electronically signed by Ricarda Bailon MD on 12/10/2022 at 6:21 AM

## 2022-12-10 NOTE — PLAN OF CARE
Problem: Discharge Planning  Goal: Discharge to home or other facility with appropriate resources  Outcome: Progressing     Problem: Pain  Goal: Verbalizes/displays adequate comfort level or baseline comfort level  Outcome: Progressing  Flowsheets  Taken 12/10/2022 0000 by Janna Morse RN  Verbalizes/displays adequate comfort level or baseline comfort level:   Encourage patient to monitor pain and request assistance   Assess pain using appropriate pain scale  Taken 12/9/2022 1404 by Jael Alvarado RN  Verbalizes/displays adequate comfort level or baseline comfort level:   Encourage patient to monitor pain and request assistance   Assess pain using appropriate pain scale   Administer analgesics based on type and severity of pain and evaluate response   Implement non-pharmacological measures as appropriate and evaluate response   Consider cultural and social influences on pain and pain management   Notify Licensed Independent Practitioner if interventions unsuccessful or patient reports new pain

## 2022-12-11 PROBLEM — R54 FRAILTY: Status: ACTIVE | Noted: 2022-01-01

## 2022-12-11 NOTE — DISCHARGE SUMMARY
Discharge Summary    Patient: Pia Denver  YOB: 1930    MRN: [id number]   Acct: [de-identified]    Primary Care Physician: LINDSEY Talamantes CNP    Admit date:  12/9/2022    Discharge date:  12/11/2022       Discharge Diagnoses:   <principal problem not specified>  Active Problems:    Acute respiratory failure with hypoxia and hypercapnia ([address]    Acute kidney injury ([address]    Oliguria  Resolved Problems:    * No resolved hospital problems. *        Admitted for: (HPI) acute respiratory failure    Hospital Course: This was an elderly lady who suffered a hip fracture; it was repaired at an outside hospital.  Postop she was obtunded and found to have hypercapnea and oliguria. She was transferred here. She was treated with fluids and bipap. However she remained oliguric and tolerated the bipap poorly. It was established by her caretaker that she was a limited code x4.       Ultimately due to her tenuous prognosis and her poor tolerance of the therapies it was decided to treat her with comfort measures and she passed peacefully at 9516 12.11.22.      CAUSE OF DEATH:   Acute respiratory failure due to frailty    Consultants:  Nephrology          Time Spent: 35 minutes    Electronically signed by Lionel Mesa MD on 12/11/2022 at 3:11 PM    Discharging Hospitalist

## 2022-12-11 NOTE — PROGRESS NOTES
No heart beat or respiration.     Pronounced dead 12.11.22. [id number]    Cause of death: respiratory failure

## 2022-12-15 NOTE — PROGRESS NOTES
Physician Progress Note      PATIENT:               Kaya Cortés  [facility] #:                  [id number]  :                       4/10/1930  ADMIT DATE:       2022 2:04 PM  [address] [place] DATE:        2022 9:33 AM  RESPONDING  PROVIDER #:        Tyler Turcios MD          QUERY TEXT:    Patient admitted with acute respiratory failure. Noted documentation   previous query response stated postoperative respiratory failure is due to the   procedure and the DC summary states acute respiratory failure due to frailty. If possible, please document in progress notes and discharge summary if you   are evaluating and /or treating any of the following: The medical record reflects the following:  Risk Factors: recent surgery  Clinical Indicators: previous query response stated postoperative respiratory   failure is due to the procedure and the DC summary states acute respiratory   failure due to frailty. Patient had recent surgery, found to have PCO2 58   post-surgery. CXR showed left lower lobe atelectasis vs pneumonia with small   pleural effusion. WBC 15.5, procal 2.44  Treatment: Bipap, IV Rocephin, patient switched to comfort care only on 12/10    Thank you,  Leisa Marquez MSN, RN, CCDS, CRCR  Options provided:  -- Acute respiratory failure due to procedure  -- Acute respiratory failure due to pneumonia  -- Acute respiratory failure due to frailty  -- Other - I will add my own diagnosis  -- Disagree - Not applicable / Not valid  -- Disagree - Clinically unable to determine / Unknown  -- Refer to Clinical Documentation Reviewer    PROVIDER RESPONSE TEXT:    After study, acute respiratory failure due to frailty. Query created by: Marlee Hutchinson on 12/15/2022 3:02 PM      QUERY TEXT:    Patient admitted with acute respiratory failure. Documentation reflects   pneumonia in H&P. If possible, please document in the progress notes and   discharge summary if pneumonia was:     The medical record reflects the following:  Risk Factors: advanced age, recent surgery  Clinical Indicators: H&P states \"Left lower lobe atelectasis versus pneumonia   -seen on chest x-ray. \" Patient was on Rocephin prior to being comfort care   only. WBC 15.5, procal 2.44  Treatment: Rocephin, comfort care    Thank you,  Get MEDINA, RN, CCDS  Options provided:  -- Pneumonia confirmed after study  -- Pneumonia ruled out after study  -- Other - I will add my own diagnosis  -- Disagree - Not applicable / Not valid  -- Disagree - Clinically unable to determine / Unknown  -- Refer to Clinical Documentation Reviewer    PROVIDER RESPONSE TEXT:    Pneumonia ruled out after study. Query created by: Toni Mckeon on 12/15/2022 3:02 PM      QUERY TEXT:    Pt admitted with acute respiratory failure. Pt noted to have WBC 15.5, procal   2.44, KYLAH, tachycardia 107, Hypotension 89/54 (66) on 12/10 1950, oliguria, . If possible, please document in the progress notes and discharge summary if   you are evaluating and /or treating any of the following: The medical record reflects the following:  Risk Factors: possible pna  Clinical Indicators: On admission patient had tachycardia 107,  WBC 15.5,   procal 2.44, KYLAH with creatinine 2.0. Patient became Oliguric and BP on 12/10   at 1950 was 89/54. Patient was made comfort care only on 12/10. Treatment: IV Rocephin, comfort care    Thank you,  Get Angeles MSN, RN, CCDS, CRCR  Options provided:  -- Sepsis, present on admission  -- Sepsis was ruled out  -- Other - I will add my own diagnosis  -- Disagree - Not applicable / Not valid  -- Disagree - Clinically unable to determine / Unknown  -- Refer to Clinical Documentation Reviewer    PROVIDER RESPONSE TEXT:    This patient has sepsis which was present on admission.     Query created by: Toni Mckeon on 12/15/2022 3:02 PM      Electronically signed by:  Tari Cuba MD 12/15/2022 3:40 PM